# Patient Record
Sex: FEMALE | Race: WHITE | NOT HISPANIC OR LATINO | Employment: FULL TIME | URBAN - NONMETROPOLITAN AREA
[De-identification: names, ages, dates, MRNs, and addresses within clinical notes are randomized per-mention and may not be internally consistent; named-entity substitution may affect disease eponyms.]

---

## 2021-02-25 ENCOUNTER — LAB VISIT (OUTPATIENT)
Dept: LAB | Facility: HOSPITAL | Age: 41
End: 2021-02-25
Attending: NURSE PRACTITIONER
Payer: COMMERCIAL

## 2021-02-25 DIAGNOSIS — D69.6 THROMBOCYTOPENIA, UNSPECIFIED: Primary | ICD-10-CM

## 2021-02-25 DIAGNOSIS — K74.00 HEPATIC FIBROSIS: ICD-10-CM

## 2021-02-25 DIAGNOSIS — K75.81 NONALCOHOLIC STEATOHEPATITIS: ICD-10-CM

## 2021-02-25 DIAGNOSIS — R32 UNSPECIFIED URINARY INCONTINENCE: ICD-10-CM

## 2021-02-25 DIAGNOSIS — E11.9 DIABETES MELLITUS WITHOUT COMPLICATION: ICD-10-CM

## 2021-02-25 LAB
ALBUMIN SERPL BCP-MCNC: 3.8 G/DL (ref 3.5–5.2)
ALP SERPL-CCNC: 109 U/L (ref 55–135)
ALT SERPL W/O P-5'-P-CCNC: 42 U/L (ref 10–44)
ANION GAP SERPL CALC-SCNC: 3 MMOL/L (ref 8–16)
AST SERPL-CCNC: 25 U/L (ref 10–40)
BASOPHILS # BLD AUTO: 0.01 K/UL (ref 0–0.2)
BASOPHILS NFR BLD: 0.4 % (ref 0–1.9)
BILIRUB SERPL-MCNC: 1.4 MG/DL (ref 0.1–1)
BUN SERPL-MCNC: 10 MG/DL (ref 6–20)
CALCIUM SERPL-MCNC: 9 MG/DL (ref 8.7–10.5)
CHLORIDE SERPL-SCNC: 109 MMOL/L (ref 95–110)
CO2 SERPL-SCNC: 28 MMOL/L (ref 23–29)
CREAT SERPL-MCNC: 0.6 MG/DL (ref 0.5–1.4)
DIFFERENTIAL METHOD: ABNORMAL
EOSINOPHIL # BLD AUTO: 0 K/UL (ref 0–0.5)
EOSINOPHIL NFR BLD: 1.5 % (ref 0–8)
ERYTHROCYTE [DISTWIDTH] IN BLOOD BY AUTOMATED COUNT: 13.8 % (ref 11.5–14.5)
EST. GFR  (AFRICAN AMERICAN): >60 ML/MIN/1.73 M^2
EST. GFR  (NON AFRICAN AMERICAN): >60 ML/MIN/1.73 M^2
GLUCOSE SERPL-MCNC: 185 MG/DL (ref 70–110)
HCT VFR BLD AUTO: 37 % (ref 37–48.5)
HGB BLD-MCNC: 12.2 G/DL (ref 12–16)
IMM GRANULOCYTES # BLD AUTO: 0.01 K/UL (ref 0–0.04)
IMM GRANULOCYTES NFR BLD AUTO: 0.4 % (ref 0–0.5)
LYMPHOCYTES # BLD AUTO: 0.7 K/UL (ref 1–4.8)
LYMPHOCYTES NFR BLD: 25.1 % (ref 18–48)
MCH RBC QN AUTO: 30 PG (ref 27–31)
MCHC RBC AUTO-ENTMCNC: 33 G/DL (ref 32–36)
MCV RBC AUTO: 91 FL (ref 82–98)
MONOCYTES # BLD AUTO: 0.2 K/UL (ref 0.3–1)
MONOCYTES NFR BLD: 6.9 % (ref 4–15)
NEUTROPHILS # BLD AUTO: 1.7 K/UL (ref 1.8–7.7)
NEUTROPHILS NFR BLD: 65.7 % (ref 38–73)
NRBC BLD-RTO: 0 /100 WBC
PLATELET # BLD AUTO: 67 K/UL (ref 150–350)
PMV BLD AUTO: 12.6 FL (ref 9.2–12.9)
POTASSIUM SERPL-SCNC: 4.3 MMOL/L (ref 3.5–5.1)
PROT SERPL-MCNC: 6.9 G/DL (ref 6–8.4)
RBC # BLD AUTO: 4.07 M/UL (ref 4–5.4)
SODIUM SERPL-SCNC: 140 MMOL/L (ref 136–145)
WBC # BLD AUTO: 2.59 K/UL (ref 3.9–12.7)

## 2021-02-25 PROCEDURE — 36415 COLL VENOUS BLD VENIPUNCTURE: CPT

## 2021-02-25 PROCEDURE — 80053 COMPREHEN METABOLIC PANEL: CPT

## 2021-02-25 PROCEDURE — 85025 COMPLETE CBC W/AUTO DIFF WBC: CPT

## 2021-05-10 ENCOUNTER — PATIENT MESSAGE (OUTPATIENT)
Dept: RESEARCH | Facility: HOSPITAL | Age: 41
End: 2021-05-10

## 2022-04-08 PROBLEM — Z63.4 BEREAVEMENT: Status: ACTIVE | Noted: 2022-03-09

## 2022-04-08 PROBLEM — D70.9 NEUTROPENIA: Status: ACTIVE | Noted: 2020-12-22

## 2022-04-08 PROBLEM — E11.9 TYPE 2 DIABETES MELLITUS: Status: ACTIVE | Noted: 2017-03-13

## 2022-04-08 PROBLEM — D69.6 THROMBOCYTOPENIA: Status: ACTIVE | Noted: 2022-04-08

## 2022-04-08 PROBLEM — N81.11 CYSTOCELE, MIDLINE: Status: ACTIVE | Noted: 2022-04-08

## 2022-04-08 PROBLEM — F43.21 ADJUSTMENT DISORDER WITH DEPRESSED MOOD: Status: ACTIVE | Noted: 2022-03-09

## 2022-04-08 PROBLEM — K62.5 HEMORRHAGE, ANAL OR RECTAL: Status: ACTIVE | Noted: 2022-04-08

## 2022-04-08 PROBLEM — K75.81 NONALCOHOLIC STEATOHEPATITIS (NASH): Status: ACTIVE | Noted: 2017-03-13

## 2022-04-08 PROBLEM — F41.9 ANXIETY DISORDER: Status: ACTIVE | Noted: 2017-05-31

## 2022-04-08 PROBLEM — R32 INCONTINENCE OF URINE: Status: ACTIVE | Noted: 2022-04-08

## 2022-04-08 PROBLEM — K74.00 LIVER FIBROSIS: Status: ACTIVE | Noted: 2017-03-13

## 2022-04-08 PROBLEM — B02.9 SHINGLES: Status: ACTIVE | Noted: 2022-04-08

## 2022-05-24 ENCOUNTER — HOSPITAL ENCOUNTER (EMERGENCY)
Facility: HOSPITAL | Age: 42
Discharge: HOME | End: 2022-05-24
Attending: EMERGENCY MEDICINE
Payer: MEDICAID

## 2022-05-24 VITALS
RESPIRATION RATE: 18 BRPM | HEART RATE: 103 BPM | WEIGHT: 257 LBS | DIASTOLIC BLOOD PRESSURE: 83 MMHG | TEMPERATURE: 98 F | BODY MASS INDEX: 40.25 KG/M2 | OXYGEN SATURATION: 97 % | SYSTOLIC BLOOD PRESSURE: 187 MMHG

## 2022-05-24 DIAGNOSIS — D61.818 PANCYTOPENIA: Primary | ICD-10-CM

## 2022-05-24 DIAGNOSIS — R10.9 CHRONIC ABDOMINAL PAIN: ICD-10-CM

## 2022-05-24 DIAGNOSIS — G89.29 CHRONIC ABDOMINAL PAIN: ICD-10-CM

## 2022-05-24 LAB
ALBUMIN SERPL BCP-MCNC: 3.8 G/DL (ref 3.5–5.2)
ALP SERPL-CCNC: 130 U/L (ref 55–135)
ALT SERPL W/O P-5'-P-CCNC: 49 U/L (ref 10–44)
ANION GAP SERPL CALC-SCNC: 4 MMOL/L (ref 8–16)
AST SERPL-CCNC: 37 U/L (ref 10–40)
BACTERIA #/AREA URNS HPF: NEGATIVE /HPF
BASOPHILS # BLD AUTO: 0.01 K/UL (ref 0–0.2)
BASOPHILS NFR BLD: 0.5 % (ref 0–1.9)
BILIRUB SERPL-MCNC: 1 MG/DL (ref 0.1–1)
BILIRUB UR QL STRIP: NEGATIVE
BUN SERPL-MCNC: 9 MG/DL (ref 6–20)
CALCIUM SERPL-MCNC: 9.2 MG/DL (ref 8.7–10.5)
CHLORIDE SERPL-SCNC: 106 MMOL/L (ref 95–110)
CLARITY UR: CLEAR
CO2 SERPL-SCNC: 30 MMOL/L (ref 23–29)
COLOR UR: YELLOW
CREAT SERPL-MCNC: 0.6 MG/DL (ref 0.5–1.4)
DIFFERENTIAL METHOD: ABNORMAL
EOSINOPHIL # BLD AUTO: 0 K/UL (ref 0–0.5)
EOSINOPHIL NFR BLD: 1.4 % (ref 0–8)
ERYTHROCYTE [DISTWIDTH] IN BLOOD BY AUTOMATED COUNT: 13.9 % (ref 11.5–14.5)
EST. GFR  (AFRICAN AMERICAN): >60 ML/MIN/1.73 M^2
EST. GFR  (NON AFRICAN AMERICAN): >60 ML/MIN/1.73 M^2
GLUCOSE SERPL-MCNC: 113 MG/DL (ref 70–110)
GLUCOSE UR QL STRIP: NEGATIVE
HCT VFR BLD AUTO: 36.3 % (ref 37–48.5)
HGB BLD-MCNC: 11.9 G/DL (ref 12–16)
HGB UR QL STRIP: NEGATIVE
HYALINE CASTS #/AREA URNS LPF: 1.2 /LPF
IMM GRANULOCYTES # BLD AUTO: 0 K/UL (ref 0–0.04)
IMM GRANULOCYTES NFR BLD AUTO: 0 % (ref 0–0.5)
KETONES UR QL STRIP: NEGATIVE
LEUKOCYTE ESTERASE UR QL STRIP: ABNORMAL
LIPASE SERPL-CCNC: 100 U/L (ref 23–300)
LYMPHOCYTES # BLD AUTO: 0.6 K/UL (ref 1–4.8)
LYMPHOCYTES NFR BLD: 27.5 % (ref 18–48)
MCH RBC QN AUTO: 29.5 PG (ref 27–31)
MCHC RBC AUTO-ENTMCNC: 32.8 G/DL (ref 32–36)
MCV RBC AUTO: 90 FL (ref 82–98)
MICROSCOPIC COMMENT: ABNORMAL
MONOCYTES # BLD AUTO: 0.1 K/UL (ref 0.3–1)
MONOCYTES NFR BLD: 5.3 % (ref 4–15)
NEUTROPHILS # BLD AUTO: 1.4 K/UL (ref 1.8–7.7)
NEUTROPHILS NFR BLD: 65.3 % (ref 38–73)
NITRITE UR QL STRIP: NEGATIVE
NRBC BLD-RTO: 0 /100 WBC
PH UR STRIP: 6 [PH] (ref 5–8)
PLATELET # BLD AUTO: 31 K/UL (ref 150–450)
PMV BLD AUTO: 10.7 FL (ref 9.2–12.9)
POTASSIUM SERPL-SCNC: 3.5 MMOL/L (ref 3.5–5.1)
PROT SERPL-MCNC: 7.2 G/DL (ref 6–8.4)
PROT UR QL STRIP: NEGATIVE
RBC # BLD AUTO: 4.04 M/UL (ref 4–5.4)
RBC #/AREA URNS HPF: 1 /HPF (ref 0–4)
SODIUM SERPL-SCNC: 140 MMOL/L (ref 136–145)
SP GR UR STRIP: 1.01 (ref 1–1.03)
SQUAMOUS #/AREA URNS HPF: 3 /HPF
URN SPEC COLLECT METH UR: ABNORMAL
UROBILINOGEN UR STRIP-ACNC: 1 EU/DL
WBC # BLD AUTO: 2.07 K/UL (ref 3.9–12.7)
WBC #/AREA URNS HPF: 2 /HPF (ref 0–5)

## 2022-05-24 PROCEDURE — 81000 URINALYSIS NONAUTO W/SCOPE: CPT | Performed by: NURSE PRACTITIONER

## 2022-05-24 PROCEDURE — 99284 EMERGENCY DEPT VISIT MOD MDM: CPT | Mod: 25

## 2022-05-24 PROCEDURE — 25000003 PHARM REV CODE 250: Performed by: NURSE PRACTITIONER

## 2022-05-24 PROCEDURE — 83690 ASSAY OF LIPASE: CPT | Performed by: NURSE PRACTITIONER

## 2022-05-24 PROCEDURE — 63600175 PHARM REV CODE 636 W HCPCS: Performed by: NURSE PRACTITIONER

## 2022-05-24 PROCEDURE — 96372 THER/PROPH/DIAG INJ SC/IM: CPT | Performed by: NURSE PRACTITIONER

## 2022-05-24 PROCEDURE — 80053 COMPREHEN METABOLIC PANEL: CPT | Performed by: NURSE PRACTITIONER

## 2022-05-24 PROCEDURE — 36415 COLL VENOUS BLD VENIPUNCTURE: CPT | Performed by: NURSE PRACTITIONER

## 2022-05-24 PROCEDURE — 85025 COMPLETE CBC W/AUTO DIFF WBC: CPT | Performed by: NURSE PRACTITIONER

## 2022-05-24 RX ORDER — DICYCLOMINE HYDROCHLORIDE 10 MG/ML
20 INJECTION INTRAMUSCULAR
Status: COMPLETED | OUTPATIENT
Start: 2022-05-24 | End: 2022-05-24

## 2022-05-24 RX ORDER — LIDOCAINE HYDROCHLORIDE 20 MG/ML
10 SOLUTION OROPHARYNGEAL ONCE
Status: COMPLETED | OUTPATIENT
Start: 2022-05-24 | End: 2022-05-24

## 2022-05-24 RX ORDER — MAG HYDROX/ALUMINUM HYD/SIMETH 200-200-20
30 SUSPENSION, ORAL (FINAL DOSE FORM) ORAL ONCE
Status: COMPLETED | OUTPATIENT
Start: 2022-05-24 | End: 2022-05-24

## 2022-05-24 RX ORDER — ONDANSETRON 4 MG/1
4 TABLET, ORALLY DISINTEGRATING ORAL
Status: COMPLETED | OUTPATIENT
Start: 2022-05-24 | End: 2022-05-24

## 2022-05-24 RX ADMIN — ALUMINUM HYDROXIDE, MAGNESIUM HYDROXIDE, AND SIMETHICONE 30 ML: 200; 200; 20 SUSPENSION ORAL at 05:05

## 2022-05-24 RX ADMIN — DICYCLOMINE HYDROCHLORIDE 20 MG: 20 INJECTION INTRAMUSCULAR at 05:05

## 2022-05-24 RX ADMIN — ONDANSETRON 4 MG: 4 TABLET, ORALLY DISINTEGRATING ORAL at 05:05

## 2022-05-24 RX ADMIN — LIDOCAINE HYDROCHLORIDE 10 ML: 20 SOLUTION ORAL; TOPICAL at 05:05

## 2022-05-24 NOTE — Clinical Note
"Rachael Hobbscaro Morales was seen and treated in our emergency department on 5/24/2022.  She may return to work on 05/26/2022.       If you have any questions or concerns, please don't hesitate to call.      Will Sterling MD"

## 2022-05-25 NOTE — ED PROVIDER NOTES
"Encounter Date: 5/24/2022       History     Chief Complaint   Patient presents with    Abdominal Pain     Pt complains of LUQ pain that began "a couple weeks ago." Pt reports pain has progressively worsened, pain does not radiate. Pt reports tenderness and increased pain with movement. Pt also reports nausea x 4 days, denies any vomiting or diarrhea.      This is a 42-year-old white female with multiple medical problems including RUBIO he and anxiety who presents emergency department with complaints of abdominal pain.  Patient reports chronic intermittent bilateral upper quadrant abdominal pain over the last several years, stating that her symptoms have progressively worsened over the last 2 weeks.  She states that she is experiencing left upper quadrant abdominal pains that are constant and vary in intensity with no specific pattern.  Patient also reports mild nausea and decreased appetite as well.  She denies fever, URI signs and symptoms, lower abdominal pain, bowel changes, black/bloody bowel movements, or dysuria.        Review of patient's allergies indicates:   Allergen Reactions    Lactated ringers Other (See Comments)     Interstitial cystitis     Past Medical History:   Diagnosis Date    Adjustment disorder with depressed mood     Anxiety disorder due to medical condition     Back pain     Bereavement     Bladder prolapse, female, acquired     Congenital cortical and zonular cataract     Cystocele with rectocele     Diarrhea     Fibrosis of liver     Hemorrhage, anal or rectal     Incomplete uterovaginal prolapse     Incontinence of urine     Interstitial cystitis     RUBIO (nonalcoholic steatohepatitis)     Nonalcoholic steatohepatitis     Right ovarian cyst     Shingles     Squamous blepharitis of both upper and lower eyelid     Thrombocytopenia     Urinary incontinence     Uterovaginal prolapse, incomplete      No past surgical history on file.  No family history on file.  Social " History     Tobacco Use    Smoking status: Current Every Day Smoker     Packs/day: 1.00     Types: Cigarettes    Smokeless tobacco: Never Used   Substance Use Topics    Alcohol use: Yes     Comment: social    Drug use: Never     Review of Systems   Constitutional: Positive for appetite change. Negative for fever.   HENT: Negative.    Respiratory: Negative.    Cardiovascular: Negative.    Gastrointestinal: Positive for abdominal pain and nausea. Negative for constipation, diarrhea and vomiting.   Genitourinary: Negative.    Musculoskeletal: Negative.    Neurological: Negative.        Physical Exam     Initial Vitals [05/24/22 1657]   BP Pulse Resp Temp SpO2   (!) 150/73 92 18 98.1 °F (36.7 °C) 99 %      MAP       --         Physical Exam    Nursing note and vitals reviewed.  Constitutional: She appears well-developed and well-nourished. She is active. No distress.   HENT:   Head: Normocephalic and atraumatic.   Mouth/Throat: Oropharynx is clear and moist.   Eyes: EOM are normal. Pupils are equal, round, and reactive to light.   Neck: Neck supple.   Normal range of motion.  Cardiovascular: Normal rate, regular rhythm and normal heart sounds.   Pulmonary/Chest: Breath sounds normal. No respiratory distress.   Abdominal: Abdomen is soft. Bowel sounds are normal. She exhibits no distension and no mass. There is abdominal tenderness (Mild bilateral upper quadrant tenderness).   Musculoskeletal:         General: Normal range of motion.      Cervical back: Normal range of motion and neck supple.     Neurological: She is alert and oriented to person, place, and time. GCS score is 15. GCS eye subscore is 4. GCS verbal subscore is 5. GCS motor subscore is 6.   Skin: Skin is warm and dry. Capillary refill takes less than 2 seconds.   Psychiatric: She has a normal mood and affect. Her behavior is normal. Thought content normal.         ED Course   Procedures  Labs Reviewed   CBC W/ AUTO DIFFERENTIAL - Abnormal; Notable for  the following components:       Result Value    WBC 2.07 (*)     Hemoglobin 11.9 (*)     Hematocrit 36.3 (*)     Platelets 31 (*)     Gran # (ANC) 1.4 (*)     Lymph # 0.6 (*)     Mono # 0.1 (*)     All other components within normal limits    Narrative:     PLT    critical result(s) called and verbal readback obtained from   Nelly by HATPatricia 05/24/2022 18:10   COMPREHENSIVE METABOLIC PANEL - Abnormal; Notable for the following components:    CO2 30 (*)     Glucose 113 (*)     ALT 49 (*)     Anion Gap 4 (*)     All other components within normal limits   URINALYSIS, REFLEX TO URINE CULTURE - Abnormal; Notable for the following components:    Leukocytes, UA Trace (*)     All other components within normal limits    Narrative:     Preferred Collection Type->Urine, Clean Catch  Specimen Source->Urine   URINALYSIS MICROSCOPIC - Abnormal; Notable for the following components:    Hyaline Casts, UA 1.2 (*)     All other components within normal limits    Narrative:     Preferred Collection Type->Urine, Clean Catch  Specimen Source->Urine   LIPASE          Imaging Results    None          Medications   aluminum-magnesium hydroxide-simethicone 200-200-20 mg/5 mL suspension 30 mL (30 mLs Oral Given 5/24/22 1751)     And   LIDOcaine HCl 2% oral solution 10 mL (10 mLs Oral Given 5/24/22 1751)   dicyclomine injection 20 mg (20 mg Intramuscular Given 5/24/22 1751)   ondansetron disintegrating tablet 4 mg (4 mg Oral Given 5/24/22 1751)                Attending Attestation:             Attending ED Notes:    42-year-old white female with multiple medical problems including RUBIO he and anxiety who presents emergency department with complaints of abdominal pain.  Patient reports chronic intermittent bilateral upper quadrant abdominal pain over the last several years, stating that her symptoms have progressively worsened over the last 2 weeks.  Abdomen tender to the left upper quadrant to palpation but none peritonitic.  Afebrile vitals  otherwise stable.  Labs shows pancytopenia which is worsening from prior.  Patient says that she is falling a physician already and was told that it was related to her liver and instructed to have weight loss surgery and improved weight loss which she has not undertaken.  Offered patient CT abdomen and pelvis but patient says that she just wants to.  Advised patient to follow-up with their doctor for further evaluation.                 Clinical Impression:   Final diagnoses:  [D61.818] Pancytopenia (Primary)  [R10.9, G89.29] Chronic abdominal pain          ED Disposition Condition    Discharge Stable        ED Prescriptions     None        Follow-up Information     Follow up With Specialties Details Why Contact Info    Suraj Anderson III, MD Internal Medicine  If symptoms worsen, As needed [address] [facility] [address]  [phone]             Will Sterling MD  05/24/22 5969

## 2022-10-24 ENCOUNTER — HOSPITAL ENCOUNTER (OUTPATIENT)
Dept: RADIOLOGY | Facility: HOSPITAL | Age: 42
Discharge: HOME | End: 2022-10-24
Attending: INTERNAL MEDICINE
Payer: COMMERCIAL

## 2022-10-24 DIAGNOSIS — R05.9 COUGH, UNSPECIFIED TYPE: ICD-10-CM

## 2022-10-24 PROCEDURE — 71046 X-RAY EXAM CHEST 2 VIEWS: CPT | Mod: TC

## 2023-02-02 ENCOUNTER — TELEPHONE (OUTPATIENT)
Dept: GYNECOLOGIC ONCOLOGY | Facility: CLINIC | Age: 43
End: 2023-02-02
Payer: COMMERCIAL

## 2023-02-02 NOTE — NURSING
New referral received from Dr Keegan Santamaria for diagnosis of vulvar lesions along with low platelets. Pt called and name and  verified. Explained my role as nurse navigator and direct number provided. Pt scheduled to see Dr Sullivan in the next available appointment. Patient encouraged to call for any questions or concerns about her care or appointments. Pt verbalized understanding. Date time and location of appointment reviewed with pt.  Oncology Navigation   Intake  Cancer Type: Gynecologic (vuvlar moles that bleed along with low platelets)  Internal / External Referral: External (Dr Keegan Santamaria)  Date of Referral: 23  Initial Nurse Navigator Contact:  (navigator received referrral on 2023)  Referral to Initial Contact Timeline (days): 29  Date Worked: 23  First Appointment Available: 23  Appointment Date: 23  First Available Date vs. Scheduled Date (days): 0     Treatment  Current Status: Staging work-up        Support Systems: Children (daughter)     Acuity      Follow Up  No follow-ups on file.

## 2023-02-08 ENCOUNTER — OFFICE VISIT (OUTPATIENT)
Dept: GYNECOLOGIC ONCOLOGY | Facility: CLINIC | Age: 43
End: 2023-02-08
Payer: COMMERCIAL

## 2023-02-08 VITALS
WEIGHT: 252.44 LBS | SYSTOLIC BLOOD PRESSURE: 135 MMHG | DIASTOLIC BLOOD PRESSURE: 59 MMHG | HEART RATE: 80 BPM | BODY MASS INDEX: 39.54 KG/M2

## 2023-02-08 DIAGNOSIS — N90.89 VULVAR LESION: Primary | ICD-10-CM

## 2023-02-08 PROCEDURE — 1159F PR MEDICATION LIST DOCUMENTED IN MEDICAL RECORD: ICD-10-PCS | Mod: CPTII,S$GLB,, | Performed by: OBSTETRICS & GYNECOLOGY

## 2023-02-08 PROCEDURE — 99999 PR PBB SHADOW E&M-EST. PATIENT-LVL III: CPT | Mod: PBBFAC,,, | Performed by: OBSTETRICS & GYNECOLOGY

## 2023-02-08 PROCEDURE — 99204 OFFICE O/P NEW MOD 45 MIN: CPT | Mod: S$GLB,,, | Performed by: OBSTETRICS & GYNECOLOGY

## 2023-02-08 PROCEDURE — 99999 PR PBB SHADOW E&M-EST. PATIENT-LVL III: ICD-10-PCS | Mod: PBBFAC,,, | Performed by: OBSTETRICS & GYNECOLOGY

## 2023-02-08 PROCEDURE — 3078F DIAST BP <80 MM HG: CPT | Mod: CPTII,S$GLB,, | Performed by: OBSTETRICS & GYNECOLOGY

## 2023-02-08 PROCEDURE — 99204 PR OFFICE/OUTPT VISIT, NEW, LEVL IV, 45-59 MIN: ICD-10-PCS | Mod: S$GLB,,, | Performed by: OBSTETRICS & GYNECOLOGY

## 2023-02-08 PROCEDURE — 3075F SYST BP GE 130 - 139MM HG: CPT | Mod: CPTII,S$GLB,, | Performed by: OBSTETRICS & GYNECOLOGY

## 2023-02-08 PROCEDURE — 3075F PR MOST RECENT SYSTOLIC BLOOD PRESS GE 130-139MM HG: ICD-10-PCS | Mod: CPTII,S$GLB,, | Performed by: OBSTETRICS & GYNECOLOGY

## 2023-02-08 PROCEDURE — 1159F MED LIST DOCD IN RCRD: CPT | Mod: CPTII,S$GLB,, | Performed by: OBSTETRICS & GYNECOLOGY

## 2023-02-08 PROCEDURE — 3008F PR BODY MASS INDEX (BMI) DOCUMENTED: ICD-10-PCS | Mod: CPTII,S$GLB,, | Performed by: OBSTETRICS & GYNECOLOGY

## 2023-02-08 PROCEDURE — 3008F BODY MASS INDEX DOCD: CPT | Mod: CPTII,S$GLB,, | Performed by: OBSTETRICS & GYNECOLOGY

## 2023-02-08 PROCEDURE — 3078F PR MOST RECENT DIASTOLIC BLOOD PRESSURE < 80 MM HG: ICD-10-PCS | Mod: CPTII,S$GLB,, | Performed by: OBSTETRICS & GYNECOLOGY

## 2023-02-08 NOTE — PROGRESS NOTES
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  Patient states she has noted 1 or 2 of the lesions for a few years but feels like she has developed more recently.  The slowly fill with blood and when they rupture, she may have bleeding for weeks.  The largest lesion ruptured around Thanksgiving and she had oozing until New Years Anu requiring pad or panty liner continuously.    Of Note:  Seen by Dr. Damaris Marin ([facility]) who recommended Avatrombopag 3 - 7 days prior to surgery with repeat CBC on day of surgery      Per patient, she had "abnormal pap with Dr. Santamaria for which some office procedure was planned".  We will attempt to follow this up.  History of hysterectomy with bladder suspension in 2021.    Past Medical History:   Diagnosis Date    Adjustment disorder with depressed mood     Anxiety disorder due to medical condition     Arthritis     Back pain     Bereavement     Bladder prolapse, female, acquired     Congenital cortical and zonular cataract     Cystocele with rectocele     Diabetes mellitus     Diarrhea     Fibrosis of liver     Hemorrhage, anal or rectal     Incomplete uterovaginal prolapse     Incontinence of urine     Interstitial cystitis     RUBIO (nonalcoholic steatohepatitis)     Nonalcoholic steatohepatitis     Pneumonia due to COVID-19 virus 2021    PONV (postoperative nausea and vomiting)     Right ovarian cyst     Shingles     Squamous blepharitis of both upper and lower eyelid     Thrombocytopenia     Urinary incontinence     Uterovaginal prolapse, incomplete       Current Outpatient Medications   Medication Sig    acetaminophen (TYLENOL) 650 MG TbSR Take 650 mg by mouth every 8 (eight) hours as needed.    aspirin-acetaminophen-caffeine " "250-250-65 mg (EXCEDRIN MIGRAINE) 250-250-65 mg per tablet Take 1 tablet by mouth every 6 (six) hours as needed for Pain.    buPROPion (WELLBUTRIN XL) 150 MG TB24 tablet Take 1 tablet (150 mg total) by mouth once daily.    colestipoL (COLESTID) 1 gram Tab Take 2 tablets (2 g total) by mouth once daily.    doxylamine-phenylephrine (POLY HIST FORTE) 10.5-10 mg Tab Take 1 tablet by oral route three times daily as needed for sinus congestion    hydrOXYzine pamoate (VISTARIL) 25 MG Cap Take 1 capsule (25 mg total) by mouth 3 (three) times daily.    insulin glargine-lixisenatide (SOLIQUA 100/33) 100 unit-33 mcg/mL InPn pen INJECT 30 UNITS SUBCUTANEOUSLY ONCE DAILY WITHIN 1 HOUR PRIOR TO THE FIRST MEAL OF THE DAY    lumateperone (CAPLYTA) 42 mg Cap TAKE 1 CAPSULE BY MOUTH NIGHTLY    montelukast (SINGULAIR) 10 mg tablet 1 tablet.    NOVOFINE AUTOCOVER 30 gauge x 1/3" Ndle USE 1 NEEDLE ONCE DAILY AS DIRECTED    pentosan polysulfate (ELMIRON) 100 mg Cap TAKE 1 CAPSULE BY MOUTH THREE TIMES DAILY WITH  WATER  AND  TAKE  1  HOUR  BEFORE  OR  2  HOURS  AFTER  A  MEAL    ALPRAZolam (XANAX) 0.5 MG tablet Take 1 tablet (0.5 mg total) by mouth nightly as needed for Insomnia or Anxiety.     No current facility-administered medications for this visit.     Review of patient's allergies indicates:   Allergen Reactions    Lactated ringers Other (See Comments)     Interstitial cystitis       Past Surgical History:   Procedure Laterality Date    CHOLECYSTECTOMY      COLONOSCOPY      HYSTERECTOMY      INCONTINENCE SURGERY      NASAL SINUS SURGERY      right knee surgery      x2    TONSILLECTOMY          FAMILY HISTORY  Family History   Problem Relation Age of Onset    Anxiety disorder Mother     Diverticulitis Mother     No Known Problems Father     No Known Problems Sister     No Known Problems Brother     No Known Problems Maternal Grandmother     No Known Problems Maternal Grandfather     No Known Problems Paternal Grandmother     No " Known Problems Paternal Grandfather        SOCIAL HISTORY    reports that she has been smoking cigarettes. She has a 25.00 pack-year smoking history. She has never used smokeless tobacco. She reports current alcohol use. She reports current drug use. Drug: Marijuana.    REVIEW OF SYSTEMS  Review of Systems   Constitutional:  Negative for activity change, appetite change, chills, fatigue, fever and unexpected weight change.   Respiratory:  Negative for cough, chest tightness and shortness of breath.    Cardiovascular:  Negative for chest pain, palpitations and leg swelling.   Gastrointestinal:  Negative for abdominal distention, abdominal pain, blood in stool, constipation, diarrhea, nausea and vomiting.   Genitourinary:  Negative for dyspareunia, dysuria, frequency, hematuria, menstrual problem, pelvic pain, urgency, vaginal bleeding and vaginal discharge.   Musculoskeletal:  Negative for arthralgias and myalgias.   Skin:  Negative for color change and rash.   Neurological:  Negative for dizziness, weakness and light-headedness.   Hematological:  Negative for adenopathy.   Psychiatric/Behavioral:  Negative for decreased concentration, dysphoric mood and sleep disturbance. The patient is not nervous/anxious.      OBJECTIVE   Vitals:    02/08/23 1449   BP: (!) 135/59   Pulse: 80      Body mass index is 39.54 kg/m².     Physical Exam:   Constitutional: She is oriented to person, place, and time. She appears well-developed and well-nourished. No distress.    HENT:   Head: Normocephalic and atraumatic.    Eyes: Conjunctivae and EOM are normal.      Pulmonary/Chest: Effort normal. No respiratory distress.        Abdominal: Soft.     Genitourinary:                     Neurological: She is alert and oriented to person, place, and time.    Skin: No rash noted.    Psychiatric: She has a normal mood and affect. Her behavior is normal.   ECOG status: 0    LABORATORY DATA  Lab data reviewed.    RADIOLOGICAL DATA  Radiology data  reviewed.    PATHOLOGY DATA  Pathology data reviewed.    ASSESSMENT    1. Vulvar lesion    Small bilateral vulvar lesions involving the labia majora.  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  Will plan for surgical excision and ablation with thrombocytopenia managament per Dr. Marin.      PLAN  Schedule excision and ablation of vulvar lesions  2.  Thrombocytopenia management per Dr. Marin  3.  Will attempt to acquire last pap smear from Dr. Santamaria (per patient report abnormal) and would consider colpo with biopsies at time of surgery.          Ming Sullivan MD

## 2023-02-13 ENCOUNTER — TELEPHONE (OUTPATIENT)
Dept: GYNECOLOGIC ONCOLOGY | Facility: CLINIC | Age: 43
End: 2023-02-13
Payer: COMMERCIAL

## 2023-02-13 DIAGNOSIS — N90.89 VULVAR LESION: Primary | ICD-10-CM

## 2023-02-13 DIAGNOSIS — D18.09 HEMANGIOMA, GENITAL: ICD-10-CM

## 2023-02-14 ENCOUNTER — ANESTHESIA EVENT (OUTPATIENT)
Dept: SURGERY | Facility: OTHER | Age: 43
End: 2023-02-14
Payer: COMMERCIAL

## 2023-03-08 ENCOUNTER — HOSPITAL ENCOUNTER (OUTPATIENT)
Dept: PREADMISSION TESTING | Facility: OTHER | Age: 43
Discharge: HOME | End: 2023-03-08
Attending: OBSTETRICS & GYNECOLOGY
Payer: COMMERCIAL

## 2023-03-08 VITALS
DIASTOLIC BLOOD PRESSURE: 62 MMHG | HEIGHT: 67 IN | TEMPERATURE: 99 F | OXYGEN SATURATION: 100 % | SYSTOLIC BLOOD PRESSURE: 132 MMHG | RESPIRATION RATE: 19 BRPM | HEART RATE: 70 BPM | BODY MASS INDEX: 39.24 KG/M2 | WEIGHT: 250 LBS

## 2023-03-08 DIAGNOSIS — Z01.818 PREOP TESTING: Primary | ICD-10-CM

## 2023-03-08 LAB
ABO + RH BLD: NORMAL
BLD GP AB SCN CELLS X3 SERPL QL: NORMAL
RH BLD: NORMAL

## 2023-03-08 PROCEDURE — 36415 COLL VENOUS BLD VENIPUNCTURE: CPT | Performed by: ANESTHESIOLOGY

## 2023-03-08 PROCEDURE — 86901 BLOOD TYPING SEROLOGIC RH(D): CPT | Performed by: ANESTHESIOLOGY

## 2023-03-08 PROCEDURE — 86900 BLOOD TYPING SEROLOGIC ABO: CPT | Performed by: ANESTHESIOLOGY

## 2023-03-08 RX ORDER — FAMOTIDINE 20 MG/1
20 TABLET, FILM COATED ORAL
Status: CANCELLED | OUTPATIENT
Start: 2023-03-08 | End: 2023-03-08

## 2023-03-08 RX ORDER — ACETAMINOPHEN 325 MG/1
975 TABLET ORAL
Status: CANCELLED | OUTPATIENT
Start: 2023-03-08 | End: 2023-03-08

## 2023-03-08 RX ORDER — AVATROMBOPAG MALEATE 20 MG/1
TABLET, FILM COATED ORAL
COMMUNITY
Start: 2023-01-19

## 2023-03-08 NOTE — ANESTHESIA PREPROCEDURE EVALUATION
03/08/2023  Rachael Diez is a 43 y.o., female.      Pre-op Assessment    I have reviewed the Patient Summary Reports.     I have reviewed the Nursing Notes. I have reviewed the NPO Status.   I have reviewed the Medications.     Review of Systems  Anesthesia Hx:  No problems with previous Anesthesia    Social:  Smoker    Hematology/Oncology:        Denies Current/Recent Cancer   Cardiovascular:   Exercise tolerance: good    Pulmonary:   Pneumonia    Hepatic/GI:   Liver Disease, Hepatitis    Endocrine:   Diabetes, well controlled  Obesity / BMI > 30  Psych:   Psychiatric History anxiety depression          Physical Exam  General: Cooperative, Alert and Oriented    Airway:  Mallampati: II   Mouth Opening: Normal  TM Distance: Normal  Tongue: Normal  Neck ROM: Normal ROM    Dental:  Intact        Anesthesia Plan  Type of Anesthesia, risks & benefits discussed:    Anesthesia Type: Gen ETT  Intra-op Monitoring Plan: Standard ASA Monitors  Post Op Pain Control Plan: multimodal analgesia  Induction:  IV  Airway Plan: Video  Informed Consent: Informed consent signed with the Patient and all parties understand the risks and agree with anesthesia plan.  All questions answered.   ASA Score: 3  Anesthesia Plan Notes: PT WITH CHRONIC low platelet due to RUBIO.  Pt seen by int med  And hematologist who have cleared her for sx. She has been given Doptolet to incr her plt count prior to surgery.  Plan is to repeat cbc day of surgery.  T&S also done.    Platelets 41k on day of surgery    Ready For Surgery From Anesthesia Perspective.     .

## 2023-03-08 NOTE — DISCHARGE INSTRUCTIONS
Information to Prepare you for your Surgery    PRE-ADMIT TESTING -  [phone]    [address] [facility]          Your surgery has been scheduled at [facility]. We are pleased to have the opportunity to serve you. For Further Information please call [phone].    On the day of surgery please report to the Information Desk on the 1st floor.    CONTACT YOUR PHYSICIAN'S OFFICE THE DAY PRIOR TO YOUR SURGERY TO OBTAIN YOUR ARRIVAL TIME.     The evening before surgery do not eat anything after 9 p.m. ( this includes hard candy, chewing gum and mints).  You may only have GATORADE, POWERADE AND WATER  from 9 p.m. until you leave your home.   DO NOT DRINK ANY LIQUIDS ON THE WAY TO THE HOSPITAL.      Why does your anesthesiologist allow you to drink Gatorade/Powerade before surgery?  Gatorade/Powerade helps to increase your comfort before surgery and to decrease your nausea after surgery. The carbohydrates in Gatorade/Powerade help reduce your body's stress response to surgery.  If you are a diabetic-drink only water prior to surgery.      Current Visitor policy(12/27/2021) - Patients may have 2 visitors pre and post procedure. Only 2 visitors will be allowed in the Surgical building with the patient. No one under the age of 12 will be allowed into the facility.    SPECIAL MEDICATION INSTRUCTIONS: TAKE medications checked off by the Anesthesiologist on your Medication List.    Angiogram Patients: Take medications as instructed by your physician, including aspirin.     Surgery Patients:    If you take ASPIRIN - Your PHYSICIAN/SURGEON will need to inform you IF/OR when you need to stop taking aspirin prior to your surgery.     The week prior to surgery do not ot take any medications containing IBUPROFEN or NSAIDS ( Advil, Motrin, Goodys, BC, Aleve, Naproxen etc) If you are not sure if you should take a medicine please call your surgeon's office.  Ok to take  Tylenol    Do Not Wear any make-up (especially eye make-up) to surgery. Please remove any false eyelashes or eyelash extensions. If you arrive the day of surgery with makeup/eyelashes on you will be required to remove prior to surgery. (There is a risk of corneal abrasions if eye makeup/eyelash extensions are not removed)      Leave all valuables at home.   Do Not wear any jewelry or watches, including any metal in body piercings. Jewelry must be removed prior to coming to the hospital.  There is a possibility that rings that are unable to be removed may be cut off if they are on the surgical extremity.    Please remove all hair extensions, wigs, clips and any other metal accessories/ ornaments from your hair.  These items may pose a flammable/fire risk in Surgery and must be removed.    Do not shave your surgical area at least 5 days prior to your surgery. The surgical prep will be performed at the hospital according to Infection Control regulations.    Contact Lens must be removed before surgery. Either do not wear the contact lens or bring a case and solution for storage.  Please bring a container for eyeglasses or dentures as required.  Bring any paperwork your physician has provided, such as consent forms,  history and physicals, doctor's orders, etc.   Bring comfortable clothes that are loose fitting to wear upon discharge. Take into consideration the type of surgery being performed.  Maintain your diet as advised per your physician the day prior to surgery.      Adequate rest the night before surgery is advised.   Park in the Parking lot behind the hospital or in the [place] [facility] across the street from the parking lot. Parking is complimentary.  If you will be discharged the same day as your procedure, please arrange for a responsible adult to drive you home or to accompany you if traveling by taxi.   YOU WILL NOT BE PERMITTED TO DRIVE OR TO LEAVE THE HOSPITAL ALONE AFTER SURGERY.   If you are  being discharged the same day, it is strongly recommended that you arrange for someone to remain with you for the first 24 hrs following your surgery.    The Surgeon will speak to your family/visitor after your surgery regarding the outcome of your surgery and post op care.  The Surgeon may speak to you after your surgery, but there is a possibility you may not remember the details.  Please check with your family members regarding the conversation with the Surgeon.    We strongly recommend whoever is bringing you home be present for discharge instructions.  This will ensure a thorough understanding for your post op home care.    ALL CHILDREN MUST ALWAYS BE ACCOMPANIED BY AN ADULT.    Visitors-Refer to current Visitor policy handouts.    Thank you for your cooperation.  The Staff of [facility].            Bathing Instructions with Hibiclens    Shower the evening before and morning of your procedure with Chlorhexidine (Hibiclens)  do not use Chlorhexidine on your face or genitals. Do not get in your eyes.  Wash your face with water and your regular face wash/soap  Use your regular shampoo  Apply Chlorhexidine (Hibiclens) directly on your skin or on a wet washcloth and wash gently. When showering: Move away from the shower stream when applying Chlorhexidine (Hibiclens) to avoid rinsing off too soon.  Rinse thoroughly with warm water  Do not dilute Chlorhexidine (Hibiclens)   Dry off as usual, do not use any deodorant, powder, body lotions, perfume, after shave or cologne.

## 2023-03-13 ENCOUNTER — TELEPHONE (OUTPATIENT)
Dept: GYNECOLOGIC ONCOLOGY | Facility: CLINIC | Age: 43
End: 2023-03-13
Payer: COMMERCIAL

## 2023-03-14 ENCOUNTER — PATIENT MESSAGE (OUTPATIENT)
Dept: SURGERY | Facility: OTHER | Age: 43
End: 2023-03-14

## 2023-03-14 ENCOUNTER — HOSPITAL ENCOUNTER (OUTPATIENT)
Facility: OTHER | Age: 43
Discharge: HOME | End: 2023-03-14
Attending: OBSTETRICS & GYNECOLOGY | Admitting: OBSTETRICS & GYNECOLOGY
Payer: COMMERCIAL

## 2023-03-14 ENCOUNTER — ANESTHESIA (OUTPATIENT)
Dept: SURGERY | Facility: OTHER | Age: 43
End: 2023-03-14
Payer: COMMERCIAL

## 2023-03-14 VITALS
HEIGHT: 67 IN | SYSTOLIC BLOOD PRESSURE: 98 MMHG | BODY MASS INDEX: 39.24 KG/M2 | RESPIRATION RATE: 16 BRPM | WEIGHT: 250 LBS | DIASTOLIC BLOOD PRESSURE: 53 MMHG | OXYGEN SATURATION: 97 % | HEART RATE: 75 BPM | TEMPERATURE: 98 F

## 2023-03-14 DIAGNOSIS — N90.89 VULVAR LESION: ICD-10-CM

## 2023-03-14 DIAGNOSIS — Z01.818 PREOP TESTING: ICD-10-CM

## 2023-03-14 DIAGNOSIS — D18.09 HEMANGIOMA, GENITAL: Primary | ICD-10-CM

## 2023-03-14 LAB
BASOPHILS # BLD AUTO: 0.02 K/UL (ref 0–0.2)
BASOPHILS NFR BLD: 0.8 % (ref 0–1.9)
DIFFERENTIAL METHOD: ABNORMAL
EOSINOPHIL # BLD AUTO: 0 K/UL (ref 0–0.5)
EOSINOPHIL NFR BLD: 1.6 % (ref 0–8)
ERYTHROCYTE [DISTWIDTH] IN BLOOD BY AUTOMATED COUNT: 14.4 % (ref 11.5–14.5)
HCT VFR BLD AUTO: 36 % (ref 37–48.5)
HGB BLD-MCNC: 11.5 G/DL (ref 12–16)
IMM GRANULOCYTES # BLD AUTO: 0.01 K/UL (ref 0–0.04)
IMM GRANULOCYTES NFR BLD AUTO: 0.4 % (ref 0–0.5)
LYMPHOCYTES # BLD AUTO: 0.7 K/UL (ref 1–4.8)
LYMPHOCYTES NFR BLD: 29.4 % (ref 18–48)
MCH RBC QN AUTO: 29.3 PG (ref 27–31)
MCHC RBC AUTO-ENTMCNC: 31.9 G/DL (ref 32–36)
MCV RBC AUTO: 92 FL (ref 82–98)
MONOCYTES # BLD AUTO: 0.2 K/UL (ref 0.3–1)
MONOCYTES NFR BLD: 6.7 % (ref 4–15)
NEUTROPHILS # BLD AUTO: 1.5 K/UL (ref 1.8–7.7)
NEUTROPHILS NFR BLD: 61.1 % (ref 38–73)
NRBC BLD-RTO: 0 /100 WBC
PLATELET # BLD AUTO: 41 K/UL (ref 150–450)
PMV BLD AUTO: 11.6 FL (ref 9.2–12.9)
POCT GLUCOSE: 154 MG/DL (ref 70–110)
RBC # BLD AUTO: 3.92 M/UL (ref 4–5.4)
WBC # BLD AUTO: 2.52 K/UL (ref 3.9–12.7)

## 2023-03-14 PROCEDURE — 63600175 PHARM REV CODE 636 W HCPCS: Performed by: ANESTHESIOLOGY

## 2023-03-14 PROCEDURE — 25000003 PHARM REV CODE 250: Performed by: NURSE ANESTHETIST, CERTIFIED REGISTERED

## 2023-03-14 PROCEDURE — 25000003 PHARM REV CODE 250: Performed by: OBSTETRICS & GYNECOLOGY

## 2023-03-14 PROCEDURE — 71000016 HC POSTOP RECOV ADDL HR: Performed by: OBSTETRICS & GYNECOLOGY

## 2023-03-14 PROCEDURE — 37000008 HC ANESTHESIA 1ST 15 MINUTES: Performed by: OBSTETRICS & GYNECOLOGY

## 2023-03-14 PROCEDURE — 56620 VULVECTOMY SIMPLE PARTIAL: CPT | Mod: ,,, | Performed by: OBSTETRICS & GYNECOLOGY

## 2023-03-14 PROCEDURE — 36000706: Performed by: OBSTETRICS & GYNECOLOGY

## 2023-03-14 PROCEDURE — 63600175 PHARM REV CODE 636 W HCPCS: Performed by: NURSE ANESTHETIST, CERTIFIED REGISTERED

## 2023-03-14 PROCEDURE — 88309 PR  SURG PATH,LEVEL VI: ICD-10-PCS | Mod: 26,,, | Performed by: PATHOLOGY

## 2023-03-14 PROCEDURE — 56620 PR PART SIMPLE REMV VULVA: ICD-10-PCS | Mod: ,,, | Performed by: OBSTETRICS & GYNECOLOGY

## 2023-03-14 PROCEDURE — 88309 TISSUE EXAM BY PATHOLOGIST: CPT | Mod: 26,,, | Performed by: PATHOLOGY

## 2023-03-14 PROCEDURE — 36000707: Performed by: OBSTETRICS & GYNECOLOGY

## 2023-03-14 PROCEDURE — 71000033 HC RECOVERY, INTIAL HOUR: Performed by: OBSTETRICS & GYNECOLOGY

## 2023-03-14 PROCEDURE — 25000003 PHARM REV CODE 250: Performed by: ANESTHESIOLOGY

## 2023-03-14 PROCEDURE — 71000015 HC POSTOP RECOV 1ST HR: Performed by: OBSTETRICS & GYNECOLOGY

## 2023-03-14 PROCEDURE — 88309 TISSUE EXAM BY PATHOLOGIST: CPT | Performed by: PATHOLOGY

## 2023-03-14 PROCEDURE — 82962 GLUCOSE BLOOD TEST: CPT | Performed by: OBSTETRICS & GYNECOLOGY

## 2023-03-14 PROCEDURE — 85025 COMPLETE CBC W/AUTO DIFF WBC: CPT | Performed by: OBSTETRICS & GYNECOLOGY

## 2023-03-14 PROCEDURE — 36415 COLL VENOUS BLD VENIPUNCTURE: CPT | Performed by: OBSTETRICS & GYNECOLOGY

## 2023-03-14 PROCEDURE — 37000009 HC ANESTHESIA EA ADD 15 MINS: Performed by: OBSTETRICS & GYNECOLOGY

## 2023-03-14 RX ORDER — ONDANSETRON 2 MG/ML
INJECTION INTRAMUSCULAR; INTRAVENOUS
Status: DISCONTINUED | OUTPATIENT
Start: 2023-03-14 | End: 2023-03-14

## 2023-03-14 RX ORDER — ACETIC ACID 3 %
LIQUID (ML) MISCELLANEOUS
Status: DISCONTINUED | OUTPATIENT
Start: 2023-03-14 | End: 2023-03-14 | Stop reason: HOSPADM

## 2023-03-14 RX ORDER — LIDOCAINE HYDROCHLORIDE AND EPINEPHRINE 10; 10 MG/ML; UG/ML
INJECTION, SOLUTION INFILTRATION; PERINEURAL
Status: DISCONTINUED | OUTPATIENT
Start: 2023-03-14 | End: 2023-03-14 | Stop reason: HOSPADM

## 2023-03-14 RX ORDER — PROCHLORPERAZINE EDISYLATE 5 MG/ML
5 INJECTION INTRAMUSCULAR; INTRAVENOUS EVERY 30 MIN PRN
Status: DISCONTINUED | OUTPATIENT
Start: 2023-03-14 | End: 2023-03-14 | Stop reason: HOSPADM

## 2023-03-14 RX ORDER — ONDANSETRON 2 MG/ML
4 INJECTION INTRAMUSCULAR; INTRAVENOUS EVERY 4 HOURS PRN
Status: CANCELLED | OUTPATIENT
Start: 2023-03-14

## 2023-03-14 RX ORDER — MIDAZOLAM HYDROCHLORIDE 1 MG/ML
INJECTION INTRAMUSCULAR; INTRAVENOUS
Status: DISCONTINUED | OUTPATIENT
Start: 2023-03-14 | End: 2023-03-14

## 2023-03-14 RX ORDER — DIPHENHYDRAMINE HYDROCHLORIDE 50 MG/ML
25 INJECTION INTRAMUSCULAR; INTRAVENOUS EVERY 6 HOURS PRN
Status: DISCONTINUED | OUTPATIENT
Start: 2023-03-14 | End: 2023-03-14 | Stop reason: HOSPADM

## 2023-03-14 RX ORDER — FAMOTIDINE 20 MG/1
20 TABLET, FILM COATED ORAL
Status: COMPLETED | OUTPATIENT
Start: 2023-03-14 | End: 2023-03-14

## 2023-03-14 RX ORDER — DEXTROMETHORPHAN HYDROBROMIDE, GUAIFENESIN 5; 100 MG/5ML; MG/5ML
650 LIQUID ORAL EVERY 8 HOURS
Qty: 30 TABLET | Refills: 0 | Status: SHIPPED | OUTPATIENT
Start: 2023-03-14

## 2023-03-14 RX ORDER — HYDROMORPHONE HYDROCHLORIDE 2 MG/ML
0.2 INJECTION, SOLUTION INTRAMUSCULAR; INTRAVENOUS; SUBCUTANEOUS
Status: CANCELLED | OUTPATIENT
Start: 2023-03-14

## 2023-03-14 RX ORDER — HYDROMORPHONE HYDROCHLORIDE 2 MG/ML
0.4 INJECTION, SOLUTION INTRAMUSCULAR; INTRAVENOUS; SUBCUTANEOUS EVERY 5 MIN PRN
Status: DISCONTINUED | OUTPATIENT
Start: 2023-03-14 | End: 2023-03-14 | Stop reason: HOSPADM

## 2023-03-14 RX ORDER — SODIUM CHLORIDE 0.9 % (FLUSH) 0.9 %
3 SYRINGE (ML) INJECTION
Status: DISCONTINUED | OUTPATIENT
Start: 2023-03-14 | End: 2023-03-14 | Stop reason: HOSPADM

## 2023-03-14 RX ORDER — OXYCODONE HYDROCHLORIDE 5 MG/1
5 TABLET ORAL EVERY 4 HOURS PRN
Qty: 10 TABLET | Refills: 0 | Status: SHIPPED | OUTPATIENT
Start: 2023-03-14 | End: 2023-07-10

## 2023-03-14 RX ORDER — MEPERIDINE HYDROCHLORIDE 25 MG/ML
12.5 INJECTION INTRAMUSCULAR; INTRAVENOUS; SUBCUTANEOUS ONCE AS NEEDED
Status: DISCONTINUED | OUTPATIENT
Start: 2023-03-14 | End: 2023-03-14 | Stop reason: HOSPADM

## 2023-03-14 RX ORDER — FENTANYL CITRATE 50 UG/ML
INJECTION, SOLUTION INTRAMUSCULAR; INTRAVENOUS
Status: DISCONTINUED | OUTPATIENT
Start: 2023-03-14 | End: 2023-03-14

## 2023-03-14 RX ORDER — OXYCODONE HYDROCHLORIDE 5 MG/1
5 TABLET ORAL
Status: DISCONTINUED | OUTPATIENT
Start: 2023-03-14 | End: 2023-03-14 | Stop reason: HOSPADM

## 2023-03-14 RX ORDER — ACETAMINOPHEN 500 MG
1000 TABLET ORAL EVERY 6 HOURS PRN
Status: CANCELLED | OUTPATIENT
Start: 2023-03-14

## 2023-03-14 RX ORDER — CARVEDILOL 3.12 MG/1
3.12 TABLET ORAL 2 TIMES DAILY WITH MEALS
COMMUNITY

## 2023-03-14 RX ORDER — MUPIROCIN 20 MG/G
OINTMENT TOPICAL
Status: DISCONTINUED | OUTPATIENT
Start: 2023-03-14 | End: 2023-03-14 | Stop reason: HOSPADM

## 2023-03-14 RX ORDER — OXYCODONE HYDROCHLORIDE 5 MG/1
5 TABLET ORAL EVERY 4 HOURS PRN
Status: CANCELLED | OUTPATIENT
Start: 2023-03-14

## 2023-03-14 RX ORDER — ACETAMINOPHEN 325 MG/1
975 TABLET ORAL
Status: COMPLETED | OUTPATIENT
Start: 2023-03-14 | End: 2023-03-14

## 2023-03-14 RX ORDER — LIDOCAINE HCL/PF 100 MG/5ML
SYRINGE (ML) INTRAVENOUS
Status: DISCONTINUED | OUTPATIENT
Start: 2023-03-14 | End: 2023-03-14

## 2023-03-14 RX ORDER — DEXAMETHASONE SODIUM PHOSPHATE 4 MG/ML
INJECTION, SOLUTION INTRA-ARTICULAR; INTRALESIONAL; INTRAMUSCULAR; INTRAVENOUS; SOFT TISSUE
Status: DISCONTINUED | OUTPATIENT
Start: 2023-03-14 | End: 2023-03-14

## 2023-03-14 RX ORDER — PROPOFOL 10 MG/ML
VIAL (ML) INTRAVENOUS
Status: DISCONTINUED | OUTPATIENT
Start: 2023-03-14 | End: 2023-03-14

## 2023-03-14 RX ORDER — LIDOCAINE HYDROCHLORIDE 10 MG/ML
1 INJECTION, SOLUTION EPIDURAL; INFILTRATION; INTRACAUDAL; PERINEURAL ONCE
Status: DISCONTINUED | OUTPATIENT
Start: 2023-03-14 | End: 2023-03-14 | Stop reason: HOSPADM

## 2023-03-14 RX ADMIN — FENTANYL CITRATE 50 MCG: 50 INJECTION, SOLUTION INTRAMUSCULAR; INTRAVENOUS at 08:03

## 2023-03-14 RX ADMIN — ONDANSETRON HYDROCHLORIDE 4 MG: 2 INJECTION INTRAMUSCULAR; INTRAVENOUS at 08:03

## 2023-03-14 RX ADMIN — MUPIROCIN: 20 OINTMENT TOPICAL at 05:03

## 2023-03-14 RX ADMIN — DEXAMETHASONE SODIUM PHOSPHATE 8 MG: 4 INJECTION, SOLUTION INTRAMUSCULAR; INTRAVENOUS at 07:03

## 2023-03-14 RX ADMIN — CARBOXYMETHYLCELLULOSE SODIUM 1 DROP: 2.5 SOLUTION/ DROPS OPHTHALMIC at 07:03

## 2023-03-14 RX ADMIN — PROPOFOL 200 MG: 10 INJECTION, EMULSION INTRAVENOUS at 07:03

## 2023-03-14 RX ADMIN — PROCHLORPERAZINE EDISYLATE 2.5 MG: 5 INJECTION INTRAMUSCULAR; INTRAVENOUS at 09:03

## 2023-03-14 RX ADMIN — FENTANYL CITRATE 25 MCG: 50 INJECTION, SOLUTION INTRAMUSCULAR; INTRAVENOUS at 08:03

## 2023-03-14 RX ADMIN — ACETAMINOPHEN 975 MG: 325 TABLET, FILM COATED ORAL at 05:03

## 2023-03-14 RX ADMIN — SODIUM CHLORIDE: 0.9 INJECTION, SOLUTION INTRAVENOUS at 06:03

## 2023-03-14 RX ADMIN — FENTANYL CITRATE 25 MCG: 50 INJECTION, SOLUTION INTRAMUSCULAR; INTRAVENOUS at 07:03

## 2023-03-14 RX ADMIN — FENTANYL CITRATE 100 MCG: 50 INJECTION, SOLUTION INTRAMUSCULAR; INTRAVENOUS at 07:03

## 2023-03-14 RX ADMIN — SODIUM CHLORIDE: 0.9 INJECTION, SOLUTION INTRAVENOUS at 07:03

## 2023-03-14 RX ADMIN — GLYCOPYRROLATE 0.2 MG: 0.2 INJECTION, SOLUTION INTRAMUSCULAR; INTRAVITREAL at 07:03

## 2023-03-14 RX ADMIN — LIDOCAINE HYDROCHLORIDE 100 MG: 20 INJECTION, SOLUTION INTRAVENOUS at 07:03

## 2023-03-14 RX ADMIN — MIDAZOLAM HYDROCHLORIDE 2 MG: 1 INJECTION, SOLUTION INTRAMUSCULAR; INTRAVENOUS at 06:03

## 2023-03-14 RX ADMIN — FAMOTIDINE 20 MG: 20 TABLET ORAL at 05:03

## 2023-03-14 NOTE — LETTER
March 14, 2023         [address]  [place] [address]  Phone: [phone]  Fax: [phone]       Patient: Rachael Diez   YOB: 1980  Date of Visit: 03/14/2023    To Whom It May Concern:    Nhung Diez  was at [facility] on 03/14/2023. The patient may return to work/school on 3/20/23 with no restrictions. If you have any questions or concerns, or if I can be of further assistance, please do not hesitate to contact me.    Sincerely,    Claudia Espinosa MD

## 2023-03-14 NOTE — DISCHARGE SUMMARY
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 Bilateral vulva with numerous hemangiomatous lesions present. Bilateral partial vulvectomy performed without complications. See operative note for full details.     Estimated Blood Loss: 15 cc         Specimens:   Specimen (24h ago, onward)       Start     Ordered    03/14/23 0743  Specimen to Pathology, Surgery Gynecology and Obstetrics  Once        Comments: 1. Right labia majora, stitch at 12  2. Left labia majora, stitch at 12   References:    Click here for ordering Quick Tip   Question:  Procedure Type:  Answer:  Gynecology and Obstetrics    03/14/23 0864                      Discharge Note    OUTCOME: Patient tolerated treatment/procedure well without complication and is now ready for discharge.    DISPOSITION: Home or Self Care    FINAL DIAGNOSIS:  Hemangioma, genital s/p partial vulvectomy    FOLLOWUP: In clinic    DISCHARGE INSTRUCTIONS:    Discharge Procedure Orders   Diet general     Lifting restrictions   Order Comments: LIFTING:  No lifting greater than 15 lb for 6 weeks.    PELVIC REST:  No douching, tampons, or intercourse for 6 weeks.    If prescribed, vaginal estrogen cream may be used during the postoperative period.     DRIVING:  No driving while on narcotics. Driving may be resumed initially with a competent passenger one to two weeks after surgery if no longer taking narcotics.     Other  restrictions (specify):   Order Comments: No alcoholic beverages while taking narcotic pain medications.  Do not drive while taking narcotic pain medication.    No douching, tampons, or intercourse for six weeks if you had any kind of procedure performed in the outside or inside of your vagina.      Avoid straining with bowel movements and/or constipation.     Wound care routine (specify)   Order Comments: VULVAR CARE:  After urinating or having a bowel movement, do the following to clean and dry the area of your incisions   - Use a moistened wipe, making sure to wipe front-to-back  - Use a small squirt bottle with lukewarm water to rinse the area  - Use a clean, soft towel to pat dry the area  - Use a blow dryer on a cool setting to dry the incisions     You may shower or take a tub bath following surgery   - When taking a tub bath to help relieve discomfort or to clean the area, use clear, warm water  - Do not use bubble bath or bath oil   - After bathing, use a clean, soft towel to pat dry the area   - Then use a blow dryer on a cool setting to dry the incisions    -Avoid wearing tight-fitting clothing over your incisions.  -You may use an ice pack on your vulva for up to 10 minutes at a time to help with swelling. Avoid placing ice directly on your skin.   -You may soak in a bathtub 4-6 times per day (10-15 minutes each time) to help with your vulvar discomfort and swelling.   -Stitches may have been placed at the time of surgery. These stitches are all dissolvable. As they begin to dissolve, you may notice yellow to white discharge, this is normal.    DRAIN CARE (IF YOU HAVE A DRAIN):  1. Keep the area where the tube exits the skin clean and dry. You may clean with soap and water on a daily basis.  2. Empty the container when it becomes half full. The container will not create suction if it becomes too full. To empty the container (or bulb):  - Open the stopper to release the suction.  - Holding the stopper out  of the way, pour the fluid into the measuring cup provided to you.  - Measure and record the amount. If you have multiple drains, please record these amounts separately. Total the amount of fluid you empty in each 24 hour period.   - Compress the container and replace the stopper. Keep the container compressed at all times except when emptying it. The compression creates the gentle suction.   3. Strip drains 3-4 times daily as instructed.   4. Goal drainage output is less than 20 cc per day for at least 2 consecutive days.   5. Call your surgical team weekly with your drain output. Bring this record to your next appointment with your physician if the drain is still in place at that time.  6. Contact your surgical team if you noticed any of the followin) your drain output decreases rapidly. 2) You develop fever/chills. 3) You have pain, redness, swelling, or heat around the drain site.    DRESSING CARE(IF YOU HAVE A DRESSING): If you have a bandage on wound, you may remove it the day after dismissal.  If you had steri-strips remove them once they begin to peel off (usually 2 weeks).  If they still haven't peeled off in 2 weeks, please peel them off.     Call MD for:  temperature >100.4     Call MD for:  persistent nausea and vomiting     Call MD for:  severe uncontrolled pain     Call MD for:  difficulty breathing, headache or visual disturbances     Call MD for:  redness, tenderness, or signs of infection (pain, swelling, redness, odor or green/yellow discharge around incision site)     Call MD for:  hives     Call MD for:   Order Comments: inability to void,urine is ketchup colored or you have large clots, vaginal bleeding is heavier than a period.    VAGINAL DISCHARGE: You may develop a vaginal discharge and intermittent vaginal spotting after surgery and up to 6 weeks postoperatively.  The discharge may have an odor and may change in color but it is normal.  This is due to dissolving stiches.  Contact your  surgical team if you develop vaginal or vulvar irritation along with a discharge.  Also contact your surgical team if you have vaginal discharge that smells like urine or stool.    CONSTIPATION REMEDIES: Patients are often constipated after surgery or with use of oral narcotic medicine. You should continue to take the stool softener, Senokot-S during the next six weeks, and consume adequate amounts of water.  If you have not had a bowel movement for 3 days after dismissal, or are uncomfortable and unable to pass stool, please try one or all of the following measures:  1.  Milk of Magnesia - 30 cc by mouth every 12 hours   2.  Dulcolax suppository - One suppository per rectum every 4-6 hours   3.  Metamucil, Fibercon or other bulk former - use as directed  4.  Fleets Enema  5.  Prunes or Prune juice    If you continue to have constipation after trying the above remedies, you should contact your surgical team using the contact information listed above      PAIN MEDICATIONS:     Take your pain medications as instructed. It is best to take pain medications before your pain becomes severe. This will allow you to take less medication yet have better pain relief. For the first 2 or 3 days it may be helpful to take your pain medications on a regular schedule (e.g. every 4 to 6 hours). This will help you to keep your pain under better control. You should then begin to take fewer medications each day until you no longer need them. Do not take pain medication on an empty stomach. This may lead to nausea and vomiting.     Activity as tolerated   Order Comments: Return to normal activity slowly as you feel able.  For 6 weeks your exercise should be limited to walking.  You may walk as far as you wish, as long as you increase your level of exertion gradually and avoid slippery surfaces.     Shower on day dressing removed (No bath)   Order Comments: You may shower at any time but should avoid immersing any abdominal incisions in  water for at least 2 weeks after surgery or until the wound is completely healed.  If given, please shower with Hibaclens soap until bottle is completely finish        Medication List        START taking these medications      oxyCODONE 5 MG immediate release tablet  Commonly known as: ROXICODONE  Take 1 tablet (5 mg total) by mouth every 4 (four) hours as needed for Pain.            CHANGE how you take these medications      * acetaminophen 650 MG Tbsr  Commonly known as: TYLENOL  Take 1 tablet (650 mg total) by mouth every 8 (eight) hours.  What changed: You were already taking a medication with the same name, and this prescription was added. Make sure you understand how and when to take each.     * acetaminophen 650 MG Tbsr  Commonly known as: TYLENOL  What changed: Another medication with the same name was added. Make sure you understand how and when to take each.           * This list has 2 medication(s) that are the same as other medications prescribed for you. Read the directions carefully, and ask your doctor or other care provider to review them with you.                CONTINUE taking these medications      ALPRAZolam 0.5 MG tablet  Commonly known as: XANAX  Take 1 tablet (0.5 mg total) by mouth nightly as needed for Insomnia or Anxiety.     aspirin-acetaminophen-caffeine 250-250-65 mg 250-250-65 mg per tablet  Commonly known as: EXCEDRIN MIGRAINE     buPROPion 150 MG TB24 tablet  Commonly known as: WELLBUTRIN XL  Take 1 tablet (150 mg total) by mouth once daily.     CAPLYTA 42 mg Cap  Generic drug: lumateperone  TAKE 1 CAPSULE BY MOUTH NIGHTLY     carvediloL 3.125 MG tablet  Commonly known as: COREG     colestipoL 1 gram Tab  Commonly known as: COLESTID  Take 2 tablets (2 g total) by mouth once daily.     DOPTELET (15 TAB PACK) 20 mg Tab  Generic drug: avatrombopag     ELMIRON 100 mg Cap  Generic drug: pentosan polysulfate  TAKE 1 CAPSULE BY MOUTH THREE TIMES DAILY WITH  WATER  AND  TAKE  1  HOUR  BEFORE   "OR  2  HOURS  AFTER  A  MEAL     NOVOFINE AUTOCOVER 30 gauge x 1/3" Ndle  Generic drug: pen needle, diabetic, safety  USE 1 NEEDLE ONCE DAILY AS DIRECTED     SOLIQUA 100/33 100 unit-33 mcg/mL Inpn pen  Generic drug: insulin glargine-lixisenatide  INJECT 30 UNITS SUBCUTANEOUSLY ONCE DAILY WITHIN 1 HOUR PRIOR TO THE FIRST MEAL OF THE DAY               Where to Get Your Medications        These medications were sent to [facility]  [address] [place] [id number]      Hours: Mon-Fri, 8a-5:30p Phone: [phone]   acetaminophen 650 MG Tbsr  oxyCODONE 5 MG immediate release tablet       Marta Monzon M.D.  OB/GYN PGY-4    "

## 2023-03-14 NOTE — OP NOTE
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  The bilateral labia majora had to numerous to count purple, vascular appearing vulvar nodules, many < 1 mm, but some swollen and 2 - 3 mm.  The appeared predominantly on the bilateral labia majora.        Procedure in Detail: After noting appropriate consents, the patient was taken to the operating room and placed in the dorsal lithotomy position. SCDs were started prior to anesthesia administration. An exam under anesthesia was done. The patient was then prepped and draped in the usual sterile manner.     A bivalve speculum was inserted into the vagina and an inspection of the vagina was done with the application of 5% acetic acid with findings as noted above.  The speculum was removed.    Next, the abnormal vulvar tissues of the bilateral labia majora were outlined within an ellipse with a marking pen and then injected with 1% lidocaine with epi.  The scalpel was used to outline the lesions and needle tip cautery was used to resect the abnormal tissue.  The ESU was used to achieve hemostasis.  A deep approximating layer of 3-0 Vicryl suture was placed.  A series of horizontal mattress and simple  interrupted sutures were placed to reapproximate the  bilateral labia majora.  The incisions were 6 cm on the right and 9 cm on the left.     Assuring hemostasis, the decision was made to close.  All instruments were removed from the field.  Sponge, lap, and needle counts were correct x 2.  The patient was awakened, extubated, and taken to the recovery room in good condition.            Complications: No    Estimated Blood Loss (EBL): 10 ml           Condition: Good    Disposition: PACU - hemodynamically stable.    Attestation: I was present and scrubbed for the entire procedure.

## 2023-03-14 NOTE — TRANSFER OF CARE
"Anesthesia Transfer of Care Note    Patient: Rachael MoralesAl    Procedure(s) Performed: Procedure(s) (LRB):  VULVECTOMY, PARTIAL (N/A)  COLPOSCOPY    Patient location: PACU    Anesthesia Type: general    Transport from OR: Transported from OR on 6-10 L/min O2 by face mask with adequate spontaneous ventilation    Post pain: adequate analgesia    Post assessment: no apparent anesthetic complications and tolerated procedure well    Post vital signs: stable    Level of consciousness: awake    Nausea/Vomiting: no nausea/vomiting    Complications: none    Transfer of care protocol was followed      Last vitals:   Visit Vitals  BP (!) 127/58 (BP Location: Right arm, Patient Position: Sitting)   Pulse 89   Temp 36.5 °C (97.7 °F) (Oral)   Resp 16   Ht 5' 7" (1.702 m)   Wt 113.4 kg (250 lb)   SpO2 97%   Breastfeeding No   BMI 39.16 kg/m²     "

## 2023-03-14 NOTE — ANESTHESIA POSTPROCEDURE EVALUATION
Anesthesia Post Evaluation    Patient: Rachael MoralesAl    Procedure(s) Performed: Procedure(s) (LRB):  VULVECTOMY, PARTIAL (N/A)  COLPOSCOPY    Final Anesthesia Type: general      Patient location during evaluation: PACU  Patient participation: Yes- Able to Participate  Level of consciousness: awake and alert  Post-procedure vital signs: reviewed and stable  Pain management: adequate  Airway patency: patent    PONV status at discharge: No PONV  Anesthetic complications: no      Cardiovascular status: blood pressure returned to baseline  Respiratory status: unassisted, spontaneous ventilation and room air  Hydration status: euvolemic  Follow-up not needed.          Vitals Value Taken Time   /53 03/14/23 0924   Temp 36.6 °C (97.9 °F) 03/14/23 0852   Pulse 75 03/14/23 0926   Resp 16 03/14/23 0922   SpO2 100 % 03/14/23 0926   Vitals shown include unvalidated device data.      No case tracking events are documented in the log.      Pain/Mandie Score: Pain Rating Prior to Med Admin: 6 (3/14/2023  5:56 AM)  Mandie Score: 8 (3/14/2023  8:52 AM)

## 2023-03-14 NOTE — H&P
"Updated H&P    Surgery risks/benefits/alternatives discussed with patient. Pt confirms understanding and desires to proceed with scheduled procedure.  She will need a work note upon anticipated discharge today.     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  Patient states she has noted 1 or 2 of the lesions for a few years but feels like she has developed more recently.  The slowly fill with blood and when they rupture, she may have bleeding for weeks.  The largest lesion ruptured around Thanksgiving and she had oozing until New Years Anu requiring pad or panty liner continuously.     Of Note:  Seen by Dr. Damaris Marin ([facility]) who recommended Avatrombopag 3 - 7 days prior to surgery with repeat CBC on day of surgery       Per patient, she had "abnormal pap with Dr. Santamaria for which some office procedure was planned".  We will attempt to follow this up.  History of hysterectomy with bladder suspension in 2021.          Past Medical History:   Diagnosis Date    Adjustment disorder with depressed mood      Anxiety disorder due to medical condition      Arthritis      Back pain      Bereavement      Bladder prolapse, female, acquired      Congenital cortical and zonular cataract      Cystocele with rectocele      Diabetes mellitus      Diarrhea      Fibrosis of liver      Hemorrhage, anal or rectal      Incomplete uterovaginal prolapse      Incontinence of urine      Interstitial cystitis      RUBIO (nonalcoholic steatohepatitis)      Nonalcoholic steatohepatitis      Pneumonia due to COVID-19 virus 2021    PONV (postoperative nausea and vomiting)      Right ovarian cyst      " "Shingles      Squamous blepharitis of both upper and lower eyelid      Thrombocytopenia      Urinary incontinence      Uterovaginal prolapse, incomplete             Current Outpatient Medications   Medication Sig    acetaminophen (TYLENOL) 650 MG TbSR Take 650 mg by mouth every 8 (eight) hours as needed.    aspirin-acetaminophen-caffeine 250-250-65 mg (EXCEDRIN MIGRAINE) 250-250-65 mg per tablet Take 1 tablet by mouth every 6 (six) hours as needed for Pain.    buPROPion (WELLBUTRIN XL) 150 MG TB24 tablet Take 1 tablet (150 mg total) by mouth once daily.    colestipoL (COLESTID) 1 gram Tab Take 2 tablets (2 g total) by mouth once daily.    doxylamine-phenylephrine (POLY HIST FORTE) 10.5-10 mg Tab Take 1 tablet by oral route three times daily as needed for sinus congestion    hydrOXYzine pamoate (VISTARIL) 25 MG Cap Take 1 capsule (25 mg total) by mouth 3 (three) times daily.    insulin glargine-lixisenatide (SOLIQUA 100/33) 100 unit-33 mcg/mL InPn pen INJECT 30 UNITS SUBCUTANEOUSLY ONCE DAILY WITHIN 1 HOUR PRIOR TO THE FIRST MEAL OF THE DAY    lumateperone (CAPLYTA) 42 mg Cap TAKE 1 CAPSULE BY MOUTH NIGHTLY    montelukast (SINGULAIR) 10 mg tablet 1 tablet.    NOVOFINE AUTOCOVER 30 gauge x 1/3" Ndle USE 1 NEEDLE ONCE DAILY AS DIRECTED    pentosan polysulfate (ELMIRON) 100 mg Cap TAKE 1 CAPSULE BY MOUTH THREE TIMES DAILY WITH  WATER  AND  TAKE  1  HOUR  BEFORE  OR  2  HOURS  AFTER  A  MEAL    ALPRAZolam (XANAX) 0.5 MG tablet Take 1 tablet (0.5 mg total) by mouth nightly as needed for Insomnia or Anxiety.      No current facility-administered medications for this visit.            Review of patient's allergies indicates:   Allergen Reactions    Lactated ringers Other (See Comments)       Interstitial cystitis               Past Surgical History:   Procedure Laterality Date    CHOLECYSTECTOMY        COLONOSCOPY        HYSTERECTOMY        INCONTINENCE SURGERY        NASAL SINUS SURGERY        right knee surgery         x2 "    TONSILLECTOMY             FAMILY HISTORY        Family History   Problem Relation Age of Onset    Anxiety disorder Mother      Diverticulitis Mother      No Known Problems Father      No Known Problems Sister      No Known Problems Brother      No Known Problems Maternal Grandmother      No Known Problems Maternal Grandfather      No Known Problems Paternal Grandmother      No Known Problems Paternal Grandfather           SOCIAL HISTORY    reports that she has been smoking cigarettes. She has a 25.00 pack-year smoking history. She has never used smokeless tobacco. She reports current alcohol use. She reports current drug use. Drug: Marijuana.     REVIEW OF SYSTEMS  Review of Systems   Constitutional:  Negative for activity change, appetite change, chills, fatigue, fever and unexpected weight change.   Respiratory:  Negative for cough, chest tightness and shortness of breath.    Cardiovascular:  Negative for chest pain, palpitations and leg swelling.   Gastrointestinal:  Negative for abdominal distention, abdominal pain, blood in stool, constipation, diarrhea, nausea and vomiting.   Genitourinary:  Negative for dyspareunia, dysuria, frequency, hematuria, menstrual problem, pelvic pain, urgency, vaginal bleeding and vaginal discharge.   Musculoskeletal:  Negative for arthralgias and myalgias.   Skin:  Negative for color change and rash.   Neurological:  Negative for dizziness, weakness and light-headedness.   Hematological:  Negative for adenopathy.   Psychiatric/Behavioral:  Negative for decreased concentration, dysphoric mood and sleep disturbance. The patient is not nervous/anxious.       OBJECTIVE       Vitals:     02/08/23 1449   BP: (!) 135/59   Pulse: 80      Body mass index is 39.54 kg/m².      Physical Exam:   Constitutional: She is oriented to person, place, and time. She appears well-developed and well-nourished. No distress.    HENT:   Head: Normocephalic and atraumatic.    Eyes: Conjunctivae and EOM  are normal.      Pulmonary/Chest: Effort normal. No respiratory distress.         Abdominal: Soft.     Genitourinary:                      Neurological: She is alert and oriented to person, place, and time.    Skin: No rash noted.    Psychiatric: She has a normal mood and affect. Her behavior is normal.   ECOG status: 0     LABORATORY DATA  Lab data reviewed.     RADIOLOGICAL DATA  Radiology data reviewed.     PATHOLOGY DATA  Pathology data reviewed.     ASSESSMENT    1. Vulvar lesion    Small bilateral vulvar lesions involving the labia majora.  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  Will plan for surgical excision and ablation with thrombocytopenia managament per Dr. Marin.      PLAN  Schedule excision and ablation of vulvar lesions  2.  Thrombocytopenia management per Dr. Marin  3.  Will attempt to acquire last pap smear from Dr. Santamaria (per patient report abnormal) and would consider colpo with biopsies at time of surgery.           Ming Sullivan MD

## 2023-03-14 NOTE — OR NURSING
VSS on RA. Pt denies pain. Peripad and PIV C/D/I. Meets Phase I discharge criteria. Ready for transfer to Phase II. Friend notified.

## 2023-03-14 NOTE — PLAN OF CARE
Rachael Diez has met all discharge criteria from Phase II. Vital Signs are stable, ambulating  without difficulty. Discharge instructions given, patient verbalized understanding. Discharged from facility via wheelchair in stable condition.

## 2023-03-14 NOTE — ANESTHESIA PROCEDURE NOTES
Intubation    Date/Time: 3/14/2023 7:09 AM  Performed by: Rachael Haney CRNA  Authorized by: Raghavendra Mazariegos MD     Intubation:     Induction:  Intravenous    Intubated:  Postinduction    Mask Ventilation:  Easy mask    Attempts:  1    Attempted By:  CRNA    Difficult Airway Encountered?: No      Airway Device:  Supraglottic airway/LMA    Airway Device Size:  4.0 (igel)    Secured at:  The lips    Placement Verified By:  Capnometry    Complicating Factors:  Small mouth    Findings Post-Intubation:  BS equal bilateral and atraumatic/condition of teeth unchanged

## 2023-03-15 LAB — POCT GLUCOSE: 196 MG/DL (ref 70–110)

## 2023-03-20 LAB
FINAL PATHOLOGIC DIAGNOSIS: NORMAL
GROSS: NORMAL
Lab: NORMAL
MICROSCOPIC EXAM: NORMAL

## 2023-03-20 NOTE — PROGRESS NOTES
I called Ms. MoralesSanjanaAl to check on her postop recovery and review her pathology results.  She has had a fair amount of pain as her disease was extensive over the labia majora.  She is slowly getting better day by day and I reviewed with her my preferred pain regimen of alternating ibuprofen and tylenol with oxycodone from breakthrough.  We discussed her benign pathology results which demonstrated angiokeratoma.  I instructed her to reach back out if her pain did not continue to improve or if she had other concerns.  Otherwise, I look forward to seeing her at her postop appointment.

## 2023-04-12 ENCOUNTER — OFFICE VISIT (OUTPATIENT)
Dept: GYNECOLOGIC ONCOLOGY | Facility: CLINIC | Age: 43
End: 2023-04-12
Payer: COMMERCIAL

## 2023-04-12 VITALS
SYSTOLIC BLOOD PRESSURE: 142 MMHG | BODY MASS INDEX: 39.63 KG/M2 | HEART RATE: 88 BPM | DIASTOLIC BLOOD PRESSURE: 69 MMHG | WEIGHT: 252.5 LBS | HEIGHT: 67 IN

## 2023-04-12 DIAGNOSIS — D28.0 ANGIOKERATOMA OF LABIA MAJORA: Primary | ICD-10-CM

## 2023-04-12 PROCEDURE — 1159F MED LIST DOCD IN RCRD: CPT | Mod: CPTII,S$GLB,, | Performed by: OBSTETRICS & GYNECOLOGY

## 2023-04-12 PROCEDURE — 3008F PR BODY MASS INDEX (BMI) DOCUMENTED: ICD-10-PCS | Mod: CPTII,S$GLB,, | Performed by: OBSTETRICS & GYNECOLOGY

## 2023-04-12 PROCEDURE — 3078F DIAST BP <80 MM HG: CPT | Mod: CPTII,S$GLB,, | Performed by: OBSTETRICS & GYNECOLOGY

## 2023-04-12 PROCEDURE — 99024 PR POST-OP FOLLOW-UP VISIT: ICD-10-PCS | Mod: S$GLB,,, | Performed by: OBSTETRICS & GYNECOLOGY

## 2023-04-12 PROCEDURE — 3008F BODY MASS INDEX DOCD: CPT | Mod: CPTII,S$GLB,, | Performed by: OBSTETRICS & GYNECOLOGY

## 2023-04-12 PROCEDURE — 99999 PR PBB SHADOW E&M-EST. PATIENT-LVL III: ICD-10-PCS | Mod: PBBFAC,,, | Performed by: OBSTETRICS & GYNECOLOGY

## 2023-04-12 PROCEDURE — 3077F PR MOST RECENT SYSTOLIC BLOOD PRESSURE >= 140 MM HG: ICD-10-PCS | Mod: CPTII,S$GLB,, | Performed by: OBSTETRICS & GYNECOLOGY

## 2023-04-12 PROCEDURE — 3078F PR MOST RECENT DIASTOLIC BLOOD PRESSURE < 80 MM HG: ICD-10-PCS | Mod: CPTII,S$GLB,, | Performed by: OBSTETRICS & GYNECOLOGY

## 2023-04-12 PROCEDURE — 99999 PR PBB SHADOW E&M-EST. PATIENT-LVL III: CPT | Mod: PBBFAC,,, | Performed by: OBSTETRICS & GYNECOLOGY

## 2023-04-12 PROCEDURE — 99024 POSTOP FOLLOW-UP VISIT: CPT | Mod: S$GLB,,, | Performed by: OBSTETRICS & GYNECOLOGY

## 2023-04-12 PROCEDURE — 3077F SYST BP >= 140 MM HG: CPT | Mod: CPTII,S$GLB,, | Performed by: OBSTETRICS & GYNECOLOGY

## 2023-04-12 PROCEDURE — 1159F PR MEDICATION LIST DOCUMENTED IN MEDICAL RECORD: ICD-10-PCS | Mod: CPTII,S$GLB,, | Performed by: OBSTETRICS & GYNECOLOGY

## 2023-04-12 NOTE — PROGRESS NOTES
"REFERRING PROVIDER  Keegan Santamaria MD     INTERVAL HISTORY  CC: postop  Rachael Diez is a 43 y.o. 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  She had a fair amount of pain after surgery and the resection was a bit more extensive than anticipated due to intraoperative findings.  She  is slowly improving.    3/14/2023 Colpo Vagina, Partial Vulvectomy:  FINDINGS:  The patient had a normal appearing vagina with cystocele and what appeared to be a single permanent suture at the vaginal apex.  The bilateral labia majora had to numerous to count purple, vascular appearing vulvar nodules, many < 1 mm, but some swollen and 2 - 3 mm.  The appeared predominantly on the bilateral labia majora (see images in media).  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  Patient states she has noted 1 or 2 of the lesions for a few years but feels like she has developed more recently.  The slowly fill with blood and when they rupture, she may have bleeding for weeks.  The largest lesion ruptured around Thanksgiving and she had oozing until New Years Anu requiring pad or panty liner continuously.     Of Note:  Seen by Dr. Damaris Marin ([facility]) who recommended Avatrombopag 3 - 7 days prior to surgery with repeat CBC on day of surgery       Per patient, she had "abnormal pap with Dr. Santamaria for which some office procedure was planned".  We will attempt to follow this up.  History of hysterectomy with bladder suspension in 2021.    OBJECTIVE   Vitals:    04/12/23 1439   BP: (!) 142/69   Pulse: 88      Body mass index is 39.55 kg/m².     Physical Exam:   Constitutional: She is oriented to person, place, and time. She appears well-developed and well-nourished. No distress.    HENT:   Head: Normocephalic and atraumatic.    Eyes: " Conjunctivae and EOM are normal.      Pulmonary/Chest: Effort normal. No respiratory distress.        Abdominal: Soft. She exhibits abdominal incision. She exhibits no distension and no mass. There is no abdominal tenderness. There is no rebound and no guarding. No hernia.     Genitourinary:                     Neurological: She is alert and oriented to person, place, and time.    Skin: No rash noted.    Psychiatric: She has a normal mood and affect. Her behavior is normal.   ECOG status: 0    LABORATORY DATA  Lab data reviewed.    RADIOLOGICAL DATA  Radiology data reviewed.    PATHOLOGY DATA  Pathology data reviewed.    ASSESSMENT    1. Angiokeratoma of labia majora     S/p partial vulvectomy healing well.  Based on her benign pathology, she can follow-up with her regular healthcare team.  She will return to see me if there are concerns regarding her surgery.      PLAN  As above        Ming Sullivan MD

## 2023-04-16 ENCOUNTER — PATIENT MESSAGE (OUTPATIENT)
Dept: GYNECOLOGIC ONCOLOGY | Facility: CLINIC | Age: 43
End: 2023-04-16
Payer: COMMERCIAL

## 2023-04-16 DIAGNOSIS — N39.9 URINARY PROBLEM IN FEMALE: Primary | ICD-10-CM

## 2023-04-17 ENCOUNTER — TELEPHONE (OUTPATIENT)
Dept: GYNECOLOGIC ONCOLOGY | Facility: CLINIC | Age: 43
End: 2023-04-17
Payer: COMMERCIAL

## 2023-04-17 ENCOUNTER — LAB VISIT (OUTPATIENT)
Dept: LAB | Facility: HOSPITAL | Age: 43
End: 2023-04-17
Attending: PHYSICIAN ASSISTANT
Payer: COMMERCIAL

## 2023-04-17 ENCOUNTER — PATIENT MESSAGE (OUTPATIENT)
Dept: GYNECOLOGIC ONCOLOGY | Facility: CLINIC | Age: 43
End: 2023-04-17
Payer: COMMERCIAL

## 2023-04-17 DIAGNOSIS — R31.9 URINARY TRACT INFECTION WITH HEMATURIA, SITE UNSPECIFIED: ICD-10-CM

## 2023-04-17 DIAGNOSIS — N39.9 URINARY PROBLEM IN FEMALE: ICD-10-CM

## 2023-04-17 DIAGNOSIS — N39.9 URINARY PROBLEM IN FEMALE: Primary | ICD-10-CM

## 2023-04-17 DIAGNOSIS — N39.0 URINARY TRACT INFECTION WITH HEMATURIA, SITE UNSPECIFIED: ICD-10-CM

## 2023-04-17 LAB
BACTERIA #/AREA URNS HPF: NEGATIVE /HPF
BILIRUB UR QL STRIP: NEGATIVE
CLARITY UR: CLEAR
COLOR UR: YELLOW
GLUCOSE UR QL STRIP: NEGATIVE
HGB UR QL STRIP: ABNORMAL
HYALINE CASTS #/AREA URNS LPF: 3.1 /LPF
KETONES UR QL STRIP: NEGATIVE
LEUKOCYTE ESTERASE UR QL STRIP: ABNORMAL
MICROSCOPIC COMMENT: ABNORMAL
NITRITE UR QL STRIP: NEGATIVE
NON-SQ EPI CELLS #/AREA URNS HPF: 6 /HPF
PH UR STRIP: 6 [PH] (ref 5–8)
PROT UR QL STRIP: NEGATIVE
RBC #/AREA URNS HPF: 3 /HPF (ref 0–4)
SP GR UR STRIP: 1.01 (ref 1–1.03)
SQUAMOUS #/AREA URNS HPF: 5 /HPF
URN SPEC COLLECT METH UR: ABNORMAL
UROBILINOGEN UR STRIP-ACNC: NEGATIVE EU/DL
WBC #/AREA URNS HPF: 15 /HPF (ref 0–5)

## 2023-04-17 PROCEDURE — 81000 URINALYSIS NONAUTO W/SCOPE: CPT | Performed by: PHYSICIAN ASSISTANT

## 2023-04-17 PROCEDURE — 87088 URINE BACTERIA CULTURE: CPT | Performed by: PHYSICIAN ASSISTANT

## 2023-04-17 PROCEDURE — 87077 CULTURE AEROBIC IDENTIFY: CPT | Mod: 59 | Performed by: PHYSICIAN ASSISTANT

## 2023-04-17 PROCEDURE — 87086 URINE CULTURE/COLONY COUNT: CPT | Performed by: PHYSICIAN ASSISTANT

## 2023-04-17 PROCEDURE — 87186 SC STD MICRODIL/AGAR DIL: CPT | Mod: 59 | Performed by: PHYSICIAN ASSISTANT

## 2023-04-17 RX ORDER — NITROFURANTOIN 25; 75 MG/1; MG/1
100 CAPSULE ORAL 2 TIMES DAILY
Qty: 14 CAPSULE | Refills: 0 | Status: SHIPPED | OUTPATIENT
Start: 2023-04-17 | End: 2023-04-24

## 2023-04-19 LAB
BACTERIA UR CULT: ABNORMAL
BACTERIA UR CULT: ABNORMAL

## 2023-04-20 DIAGNOSIS — N30.01 ACUTE CYSTITIS WITH HEMATURIA: Primary | ICD-10-CM

## 2023-04-20 RX ORDER — AMOXICILLIN AND CLAVULANATE POTASSIUM 875; 125 MG/1; MG/1
1 TABLET, FILM COATED ORAL EVERY 12 HOURS
Qty: 14 TABLET | Refills: 0 | Status: SHIPPED | OUTPATIENT
Start: 2023-04-20 | End: 2023-04-27

## 2023-10-11 PROBLEM — E66.9 OBESITY (BMI 30.0-34.9): Status: ACTIVE | Noted: 2023-10-11

## 2023-10-11 PROBLEM — L21.9 SEBORRHEIC DERMATITIS: Status: ACTIVE | Noted: 2023-10-11

## 2023-10-11 PROBLEM — Z12.31 BREAST CANCER SCREENING BY MAMMOGRAM: Status: ACTIVE | Noted: 2023-10-11

## 2023-10-11 PROBLEM — R19.7 DIARRHEA: Status: ACTIVE | Noted: 2023-10-11

## 2023-10-11 PROBLEM — G43.011 INTRACTABLE MIGRAINE WITHOUT AURA AND WITH STATUS MIGRAINOSUS: Status: ACTIVE | Noted: 2023-10-11

## 2023-10-11 PROBLEM — Z00.00 ROUTINE MEDICAL EXAM: Status: ACTIVE | Noted: 2023-10-11

## 2023-11-15 PROBLEM — H66.92 LEFT OTITIS MEDIA: Status: ACTIVE | Noted: 2023-11-15

## 2023-11-15 PROBLEM — E66.09 CLASS 2 OBESITY DUE TO EXCESS CALORIES WITH BODY MASS INDEX (BMI) OF 35.0 TO 35.9 IN ADULT: Status: ACTIVE | Noted: 2023-10-11

## 2024-01-12 ENCOUNTER — HOSPITAL ENCOUNTER (OUTPATIENT)
Dept: RADIOLOGY | Facility: HOSPITAL | Age: 44
Discharge: HOME | End: 2024-01-12
Attending: INTERNAL MEDICINE
Payer: COMMERCIAL

## 2024-01-12 VITALS — WEIGHT: 228 LBS | HEIGHT: 67 IN | BODY MASS INDEX: 35.79 KG/M2

## 2024-01-12 DIAGNOSIS — Z12.31 BREAST CANCER SCREENING BY MAMMOGRAM: ICD-10-CM

## 2024-01-12 PROCEDURE — 77067 SCR MAMMO BI INCL CAD: CPT | Mod: TC

## 2024-01-15 PROBLEM — Z00.00 ROUTINE MEDICAL EXAM: Status: RESOLVED | Noted: 2023-10-11 | Resolved: 2024-01-15

## 2024-04-17 ENCOUNTER — HOSPITAL ENCOUNTER (OUTPATIENT)
Dept: RADIOLOGY | Facility: HOSPITAL | Age: 44
Discharge: HOME | End: 2024-04-17
Attending: INTERNAL MEDICINE
Payer: COMMERCIAL
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
## 2024-04-17 PROCEDURE — 73610 X-RAY EXAM OF ANKLE: CPT | Mod: TC,LT

## 2024-04-17 PROCEDURE — 73562 X-RAY EXAM OF KNEE 3: CPT | Mod: TC,RT

## (undated) DEVICE — ELECTRODE REM PLYHSV RETURN 9

## (undated) DEVICE — JELLY SURGILUBE 5GR

## (undated) DEVICE — SUT 2/0 30IN SILK BLK BRAI

## (undated) DEVICE — SUT VICRYL 3-0 27 SH

## (undated) DEVICE — CATH SELF-CATH FEMALE 14FR 6IN

## (undated) DEVICE — LEGGING CLEAR POLY 2/PACK

## (undated) DEVICE — SOL POVIDONE SCRUB IODINE 4 OZ

## (undated) DEVICE — SYR 10CC LUER LOCK

## (undated) DEVICE — SOL POVIDONE PREP IODINE 4 OZ

## (undated) DEVICE — SUT VICRYL CTD 2-0 GI 27 SH

## (undated) DEVICE — Device

## (undated) DEVICE — NDL HYPO REG 25G X 1 1/2

## (undated) DEVICE — SEE MEDLINE ITEM 157150

## (undated) DEVICE — GOWN POLY REINF BRTH SLV XL

## (undated) DEVICE — TOWEL OR DISP STRL BLUE 4/PK

## (undated) DEVICE — DRESSING TELFA N ADH 3X8

## (undated) DEVICE — ELECTRODE NEEDLE 1IN

## (undated) DEVICE — TRAY DRY SKIN SCRUB PREP

## (undated) DEVICE — DRAPE UND BUTT W/POUCH 40X44IN

## (undated) DEVICE — SYR IRRIGATION BULB STER 60ML

## (undated) DEVICE — SOL IRR SOD CHL .9% POUR